# Patient Record
Sex: FEMALE | Race: WHITE | Employment: FULL TIME | URBAN - METROPOLITAN AREA
[De-identification: names, ages, dates, MRNs, and addresses within clinical notes are randomized per-mention and may not be internally consistent; named-entity substitution may affect disease eponyms.]

---

## 2024-03-26 ENCOUNTER — OFFICE VISIT (OUTPATIENT)
Dept: OBSTETRICS AND GYNECOLOGY | Facility: CLINIC | Age: 29
End: 2024-03-26
Payer: COMMERCIAL

## 2024-03-26 VITALS
SYSTOLIC BLOOD PRESSURE: 110 MMHG | WEIGHT: 184.97 LBS | DIASTOLIC BLOOD PRESSURE: 70 MMHG | BODY MASS INDEX: 34.04 KG/M2

## 2024-03-26 DIAGNOSIS — Z01.419 ENCOUNTER FOR WELL WOMAN EXAM WITH ROUTINE GYNECOLOGICAL EXAM: Primary | ICD-10-CM

## 2024-03-26 PROCEDURE — 1036F TOBACCO NON-USER: CPT | Performed by: NURSE PRACTITIONER

## 2024-03-26 PROCEDURE — 99395 PREV VISIT EST AGE 18-39: CPT | Performed by: NURSE PRACTITIONER

## 2024-03-26 RX ORDER — NORGESTIMATE AND ETHINYL ESTRADIOL 7DAYSX3 28
1 KIT ORAL DAILY
COMMUNITY
Start: 2024-03-03 | End: 2024-05-28

## 2024-03-26 NOTE — ASSESSMENT & PLAN NOTE
Hx of ASCUS, PAP and HPV NILM and negative in 2023.   Plans to stop her ocp. Will start a prenatal vitamin prior to trying for a pregnancy.

## 2024-03-26 NOTE — PROGRESS NOTES
"     HPI:   Ladan Trevino \"Ladan Trevino\" is a 28 y.o. who presents today for her annual gynecologic exam without complaints    She has the following concerns; None. She is doing well. Considering stopping her ocp as she wants to try for a pregnancy.     GYN HISTORY:  Periods are regular every 28-30 days, lasting 5 days.   Dysmenorrhea:none. Cyclic symptoms include none.   No intermenstrual bleeding, spotting, or discharge.    Current contraception: OCP (estrogen/progesterone)      Requests STD testing: no     PAP History   Last pap:   2023 Normal HPV Negative  History of abnormal pap: yes - Hx of ASCUS    Health Screening  Family history of breast, uterine, ovarian or colon cancer: no         The patient feels safe at home.         Review of Systems:   Constitutional: no fever and no chills.  Cardiovascular: no chest pain.   Respiratory: no shortness of breath.   Gastrointestinal: no nausea, no abdominal pain and no constipation  Genitourinary: no dysuria, no urinary incontinence, no vaginal dryness, no pelvic pain and no vaginal discharge.   Neurological: no headache.  Psychiatric: no anxiety and no depression.              Objective         /70   Wt 83.9 kg (184 lb 15.5 oz)   LMP 03/05/2024 (Exact Date)   BMI 34.04 kg/m²         Physical Exam:   Constitutional: Alert and in no acute distress. Well developed, well nourished.      Neck: No neck asymmetry. Supple. Thyroid not enlarged and there were no palpable thyroid nodules.      Cardiovascular: Heart rate and rhythm were normal, normal S1 and S2, no gallops, and no murmurs.      Pulmonary: No respiratory distress. Clear bilateral breath sounds.      Chest: Breasts: Normal appearance, no nipple discharge and no skin changes. Palpation of breasts and axillae: No palpable mass and no axillary lymphadenopathy.      Abdomen: Soft nontender; no abdominal mass palpated. Normal bowel sounds. No organomegaly.      Genitourinary:   - External genitalia: Normal.   - " Palpation of lymph nodes in groin: No inguinal lymphadenopathy.   - Bartholin's Urethral and Skenes Glands: Normal.   - Urethra: Normal.    -Bladder: Normal on palpation.   - Vagina: Normal.   - Cervix: Normal.   - Uterus: Normal. Right Adnexa/parametria: Normal. Left Adnexa/parametria: Normal.   - Perianal Area: Normal.      Skin: Normal skin color and pigmentation, normal skin turgor, and no rash     Psychiatric: Alert and oriented x 3. Affect normal to patient baseline. Mood: Appropriate.            Assessment/Plan         Problem List Items Addressed This Visit       Encounter for well woman exam with routine gynecological exam - Primary    Current Assessment & Plan     Hx of ASCUS, PAP and HPV NILM and negative in 2023.   Plans to stop her ocp. Will start a prenatal vitamin prior to trying for a pregnancy.          Follow-up annually; sooner if needed.       GUSTAVO Lawrence APRN-CNP 03/26/24 8:20 AM

## 2024-05-25 DIAGNOSIS — Z30.41 ENCOUNTER FOR SURVEILLANCE OF CONTRACEPTIVE PILLS: Primary | ICD-10-CM

## 2024-05-28 RX ORDER — NORGESTIMATE AND ETHINYL ESTRADIOL 7DAYSX3 28
1 KIT ORAL DAILY
Qty: 84 TABLET | Refills: 2 | Status: SHIPPED | OUTPATIENT
Start: 2024-05-28

## 2024-10-01 ENCOUNTER — APPOINTMENT (OUTPATIENT)
Dept: PRIMARY CARE | Facility: CLINIC | Age: 29
End: 2024-10-01
Payer: COMMERCIAL

## 2024-10-03 ENCOUNTER — LAB (OUTPATIENT)
Dept: LAB | Facility: LAB | Age: 29
End: 2024-10-03
Payer: COMMERCIAL

## 2024-10-03 DIAGNOSIS — R19.7 DIARRHEA, UNSPECIFIED: ICD-10-CM

## 2024-10-03 DIAGNOSIS — R10.84 GENERALIZED ABDOMINAL PAIN: Primary | ICD-10-CM

## 2024-10-03 DIAGNOSIS — R10.84 GENERALIZED ABDOMINAL PAIN: ICD-10-CM

## 2024-10-03 LAB
ALBUMIN SERPL BCP-MCNC: 4.5 G/DL (ref 3.4–5)
ALP SERPL-CCNC: 55 U/L (ref 33–110)
ALT SERPL W P-5'-P-CCNC: 17 U/L (ref 7–45)
ANION GAP SERPL CALC-SCNC: 10 MMOL/L (ref 10–20)
AST SERPL W P-5'-P-CCNC: 21 U/L (ref 9–39)
BASOPHILS # BLD AUTO: 0.05 X10*3/UL (ref 0–0.1)
BASOPHILS NFR BLD AUTO: 1 %
BILIRUB SERPL-MCNC: 0.6 MG/DL (ref 0–1.2)
BUN SERPL-MCNC: 18 MG/DL (ref 6–23)
CALCIUM SERPL-MCNC: 9.1 MG/DL (ref 8.6–10.3)
CHLORIDE SERPL-SCNC: 106 MMOL/L (ref 98–107)
CO2 SERPL-SCNC: 26 MMOL/L (ref 21–32)
CREAT SERPL-MCNC: 0.62 MG/DL (ref 0.5–1.05)
EGFRCR SERPLBLD CKD-EPI 2021: >90 ML/MIN/1.73M*2
EOSINOPHIL # BLD AUTO: 0.25 X10*3/UL (ref 0–0.7)
EOSINOPHIL NFR BLD AUTO: 5 %
ERYTHROCYTE [DISTWIDTH] IN BLOOD BY AUTOMATED COUNT: 14.3 % (ref 11.5–14.5)
GLUCOSE SERPL-MCNC: 79 MG/DL (ref 74–99)
HCT VFR BLD AUTO: 37.3 % (ref 36–46)
HGB BLD-MCNC: 11.9 G/DL (ref 12–16)
IMM GRANULOCYTES # BLD AUTO: 0.01 X10*3/UL (ref 0–0.7)
IMM GRANULOCYTES NFR BLD AUTO: 0.2 % (ref 0–0.9)
LYMPHOCYTES # BLD AUTO: 1.79 X10*3/UL (ref 1.2–4.8)
LYMPHOCYTES NFR BLD AUTO: 35.9 %
MCH RBC QN AUTO: 28.9 PG (ref 26–34)
MCHC RBC AUTO-ENTMCNC: 31.9 G/DL (ref 32–36)
MCV RBC AUTO: 91 FL (ref 80–100)
MONOCYTES # BLD AUTO: 0.31 X10*3/UL (ref 0.1–1)
MONOCYTES NFR BLD AUTO: 6.2 %
NEUTROPHILS # BLD AUTO: 2.57 X10*3/UL (ref 1.2–7.7)
NEUTROPHILS NFR BLD AUTO: 51.7 %
NRBC BLD-RTO: 0 /100 WBCS (ref 0–0)
PLATELET # BLD AUTO: 258 X10*3/UL (ref 150–450)
POTASSIUM SERPL-SCNC: 4.1 MMOL/L (ref 3.5–5.3)
PROT SERPL-MCNC: 7.3 G/DL (ref 6.4–8.2)
RBC # BLD AUTO: 4.12 X10*6/UL (ref 4–5.2)
SODIUM SERPL-SCNC: 138 MMOL/L (ref 136–145)
TSH SERPL-ACNC: 3.62 MIU/L (ref 0.44–3.98)
WBC # BLD AUTO: 5 X10*3/UL (ref 4.4–11.3)

## 2024-10-03 PROCEDURE — 84443 ASSAY THYROID STIM HORMONE: CPT

## 2024-10-03 PROCEDURE — 80053 COMPREHEN METABOLIC PANEL: CPT

## 2024-10-03 PROCEDURE — 36415 COLL VENOUS BLD VENIPUNCTURE: CPT

## 2024-10-03 PROCEDURE — 85025 COMPLETE CBC W/AUTO DIFF WBC: CPT

## 2024-10-07 ENCOUNTER — APPOINTMENT (OUTPATIENT)
Dept: PRIMARY CARE | Facility: CLINIC | Age: 29
End: 2024-10-07
Payer: COMMERCIAL

## 2024-10-07 VITALS
HEIGHT: 61 IN | OXYGEN SATURATION: 99 % | RESPIRATION RATE: 18 BRPM | TEMPERATURE: 97.3 F | WEIGHT: 183.4 LBS | SYSTOLIC BLOOD PRESSURE: 121 MMHG | HEART RATE: 75 BPM | DIASTOLIC BLOOD PRESSURE: 81 MMHG | BODY MASS INDEX: 34.63 KG/M2

## 2024-10-07 DIAGNOSIS — Z13.220 LIPID SCREENING: Primary | ICD-10-CM

## 2024-10-07 DIAGNOSIS — Z13.29 THYROID DISORDER SCREEN: ICD-10-CM

## 2024-10-07 DIAGNOSIS — R19.7 DIARRHEA, UNSPECIFIED TYPE: ICD-10-CM

## 2024-10-07 DIAGNOSIS — E66.09 CLASS 1 OBESITY DUE TO EXCESS CALORIES WITHOUT SERIOUS COMORBIDITY WITH BODY MASS INDEX (BMI) OF 34.0 TO 34.9 IN ADULT: ICD-10-CM

## 2024-10-07 DIAGNOSIS — R10.84 GENERALIZED ABDOMINAL PAIN: ICD-10-CM

## 2024-10-07 DIAGNOSIS — E66.811 CLASS 1 OBESITY DUE TO EXCESS CALORIES WITHOUT SERIOUS COMORBIDITY WITH BODY MASS INDEX (BMI) OF 34.0 TO 34.9 IN ADULT: ICD-10-CM

## 2024-10-07 PROCEDURE — 3008F BODY MASS INDEX DOCD: CPT | Performed by: NURSE PRACTITIONER

## 2024-10-07 PROCEDURE — 1036F TOBACCO NON-USER: CPT | Performed by: NURSE PRACTITIONER

## 2024-10-07 PROCEDURE — 99204 OFFICE O/P NEW MOD 45 MIN: CPT | Performed by: NURSE PRACTITIONER

## 2024-10-07 ASSESSMENT — ENCOUNTER SYMPTOMS
SPEECH DIFFICULTY: 0
DIARRHEA: 1
BLOOD IN STOOL: 0
DIZZINESS: 0
FREQUENCY: 0
CONSTITUTIONAL NEGATIVE: 1
VOMITING: 0
CONFUSION: 0
SLEEP DISTURBANCE: 0
SHORTNESS OF BREATH: 0
NERVOUS/ANXIOUS: 0
MYALGIAS: 0
FEVER: 0
DYSURIA: 0
ACTIVITY CHANGE: 0
WEAKNESS: 0
PALPITATIONS: 0
NAUSEA: 0
CONSTIPATION: 0
ARTHRALGIAS: 0
ABDOMINAL PAIN: 1
COUGH: 0
HEADACHES: 0
SORE THROAT: 0
CHILLS: 0
APNEA: 0
DIFFICULTY URINATING: 0

## 2024-10-07 ASSESSMENT — PATIENT HEALTH QUESTIONNAIRE - PHQ9
SUM OF ALL RESPONSES TO PHQ9 QUESTIONS 1 AND 2: 0
1. LITTLE INTEREST OR PLEASURE IN DOING THINGS: NOT AT ALL
2. FEELING DOWN, DEPRESSED OR HOPELESS: NOT AT ALL

## 2024-10-07 NOTE — PROGRESS NOTES
Subjective   Patient ID: Ladan Trevino is a 29 y.o. female who presents for New Patient Visit, Abdominal Pain, Diarrhea, and Obesity.    29-year-old female patient here to establish care.  Recently seen by gastroenterology due to abdominal pain and diarrhea.    Recent colonoscopy and EGD was still much GI unremarkable  Colestipol and bentyl prescribed but worsened her abdominal pain   Reports that she believes it was her oral contraceptive that have contributed to her discomfort.  Patient also reports a pelvic pain. Off ocp- 25 day cycle. Has GYN.  Has CT of the abdomen and pelvis with [facility] that she will schedule soon    Obesity: BMI 34.65.  Discussed diet and exercise      Abdominal Pain  Associated symptoms include diarrhea. Pertinent negatives include no arthralgias, constipation, dysuria, fever, frequency, headaches, myalgias, nausea or vomiting.   Diarrhea   Associated symptoms include abdominal pain. Pertinent negatives include no arthralgias, chills, coughing, fever, headaches, myalgias or vomiting.        Review of Systems   Constitutional: Negative.  Negative for activity change, chills and fever.   HENT:  Negative for congestion, postnasal drip, sneezing and sore throat.    Respiratory:  Negative for apnea, cough and shortness of breath.    Cardiovascular:  Negative for chest pain and palpitations.   Gastrointestinal:  Positive for abdominal pain and diarrhea. Negative for blood in stool, constipation, nausea and vomiting.   Genitourinary:  Positive for pelvic pain. Negative for difficulty urinating, dysuria, frequency, vaginal bleeding, vaginal discharge and vaginal pain.   Musculoskeletal:  Negative for arthralgias and myalgias.   Neurological:  Negative for dizziness, syncope, speech difficulty, weakness and headaches.   Psychiatric/Behavioral:  Negative for confusion and sleep disturbance. The patient is not nervous/anxious.        Objective   /81   Pulse 75   Temp 36.3 °C (97.3  "°F) (Temporal)   Resp 18   Ht 1.549 m (5' 1\")   Wt 83.2 kg (183 lb 6.4 oz)   SpO2 99%   BMI 34.65 kg/m²     Physical Exam  Vitals reviewed.   Constitutional:       Appearance: She is obese.   Cardiovascular:      Rate and Rhythm: Normal rate and regular rhythm.      Pulses: Normal pulses.      Heart sounds: Normal heart sounds.   Pulmonary:      Effort: Pulmonary effort is normal.      Breath sounds: Normal breath sounds.   Abdominal:      General: Bowel sounds are normal. There is no distension.      Palpations: There is no mass.      Tenderness: There is no abdominal tenderness.      Hernia: No hernia is present.   Neurological:      Mental Status: She is alert and oriented to person, place, and time.   Psychiatric:         Mood and Affect: Mood normal.         Behavior: Behavior normal.         Assessment/Plan   Problem List Items Addressed This Visit             ICD-10-CM    Lipid screening - Primary Z13.220    Relevant Orders    Lipid Panel    Thyroid disorder screen Z13.29    Relevant Orders    TSH with reflex to Free T4 if abnormal    Class 1 obesity due to excess calories without serious comorbidity with body mass index (BMI) of 34.0 to 34.9 in adult E66.811, E66.09, Z68.34     .A dietary \"pattern\" means generally being in the habit of eating certain types of foods while limiting others. Some people need to follow a particular dietary pattern because of their individual health needs; for example, if you have high blood pressure, your health care provider might recommend a diet low in sodium (salt).     If you are trying to improve your overall health and lower your risk of disease, establishing a healthy dietary pattern can help. This does not have to mean being extremely restrictive or eating only healthy choices. It is more of a lifestyle choice to incorporate certain dietary components while limiting others. You can find which approaches work for you, and that can help you maintain and build on a " "healthy eating pattern over time.     Some dietary patterns have been found to have specific health benefits. Others may have benefits for some people, for example, those with specific health conditions.     Mediterranean diet -- A Mediterranean diet is high in fruits, vegetables, beans, nuts, and seeds. It also includes olive oil as a source of healthy fat, and moderate amounts of fish, poultry, and dairy products, but little red meat. Studies have found that this type of diet lowers the risk of heart disease and stroke and helps control blood sugar in people with diabetes. It may also lower the risk of certain types of cancer.     DASH diet -- The Dietary Approaches to Stop Hypertension (DASH) diet requires a person to eat four to five servings of fruit, four to five servings of vegetables, and two to three servings of low-fat dairy per day. All foods must contain less than 25 percent total fat per serving. It can help lower blood pressure, especially when the person also reduces their salt intake. The DASH diet may also lower the risk of other health problems, including colorectal cancer, heart disease, and gout (in males).     Plant-based diets -- Plant-based diets focus on vegetables, fruits, grains, beans, and nuts and limit, or completely avoid, food from animals, such as meat and dairy. There are different types of plant-based diets, including:     ?Macrobiotic - This type of diet includes lots of vegetables, fruits, legumes, and seaweeds, as well as whole grains like brown rice. People who follow a macrobiotic diet might limit animal foods to white meat or fish once or twice a week.     ?Semi-vegetarian (sometimes called \"flexitarian\") - Some people choose to eat meat only occasionally, or avoid red meat but eat poultry and/or fish.     ?Lacto-ovovegetarian - This means a person eats milk and dairy products as well as eggs, but no meat.     ?Lactovegetarian - This means a person eats milk and dairy " products, but not eggs or meat.     ?Vegan - This means avoiding all food products that come from animal sources, including eggs and milk products.     Plant-based diets may lower the risk of obesity, heart disease, high blood pressure, diabetes, and some cancers. While plant-based diets are generally healthy, as with other patterns, it's also important to limit things like processed foods, unhealthy fats, sugar, and salt. If you do not eat meat and/or other animal products, it is important to ensure you are getting enough nutrients overall. Vegans, in particular, have been found to be low in nutrients like calcium and vitamin B12.      Low-fat diet -- A low-fat diet involves limiting intake of calories from fat. This pattern may have some benefits when it comes to maintaining a lower weight, but overall it is likely no more effective than other approaches in helping people lose weight. In general, few, if any, other health benefits have been linked to low-fat diets.     When following a low-fat diet, it is important to focus on whole grains, legumes, fruits, and vegetables, with limited refined grains and sugar. Fats should be from healthy sources, such as fatty fish, nuts, olive oil, and canola oil.      Low-cholesterol diet -- Cholesterol is typically found in foods with a lot of saturated fat, like red meat, butter, and cheese. A low-cholesterol diet focuses on limiting the amount of cholesterol in the diet. Since high-cholesterol foods also generally are high in unhealthy fats (such as saturated fat), limiting the cholesterol in your diet can also help reduce the amount of unhealthy fats you consume.          Relevant Orders    CBC and Auto Differential    Comprehensive Metabolic Panel    Diarrhea R19.7     Can take over-the-counter antidiarrheal for management  Follow-up with GI  Normal colonoscopy         Generalized abdominal pain R10.84     Intolerant benzo and colestipol  Follow-up with GI  Has CT of  abdomen and pelvis ordered with specialist-encouraged to schedule

## 2024-10-07 NOTE — ASSESSMENT & PLAN NOTE
Intolerant benzo and colestipol  Follow-up with GI  Has CT of abdomen and pelvis ordered with specialist-encouraged to schedule

## 2025-03-31 ENCOUNTER — APPOINTMENT (OUTPATIENT)
Dept: OBSTETRICS AND GYNECOLOGY | Facility: CLINIC | Age: 30
End: 2025-03-31
Payer: COMMERCIAL

## 2025-06-16 ENCOUNTER — APPOINTMENT (OUTPATIENT)
Dept: OBSTETRICS AND GYNECOLOGY | Facility: CLINIC | Age: 30
End: 2025-06-16
Payer: COMMERCIAL

## 2025-06-16 VITALS
WEIGHT: 174.38 LBS | SYSTOLIC BLOOD PRESSURE: 110 MMHG | DIASTOLIC BLOOD PRESSURE: 70 MMHG | BODY MASS INDEX: 32.95 KG/M2

## 2025-06-16 DIAGNOSIS — Z01.419 ENCOUNTER FOR WELL WOMAN EXAM WITH ROUTINE GYNECOLOGICAL EXAM: Primary | ICD-10-CM

## 2025-06-16 DIAGNOSIS — N94.6 PAINFUL MENSTRUAL PERIODS: ICD-10-CM

## 2025-06-16 PROCEDURE — 1036F TOBACCO NON-USER: CPT | Performed by: NURSE PRACTITIONER

## 2025-06-16 PROCEDURE — 99395 PREV VISIT EST AGE 18-39: CPT | Performed by: NURSE PRACTITIONER

## 2025-06-16 RX ORDER — IBUPROFEN 600 MG/1
600 TABLET, FILM COATED ORAL EVERY 6 HOURS PRN
Qty: 30 TABLET | Refills: 0 | Status: SHIPPED | OUTPATIENT
Start: 2025-06-16 | End: 2025-06-26

## 2025-06-16 NOTE — PROGRESS NOTES
"     HPI:   Ladan Trevino \"Ladan Trevino\" is a 29 y.o. who presents today for her annual gynecologic exam without complaints    She has the following concerns; None. She is doing well. Stopped her ocp in June 2024. She is having cramping and heavier bleeding since she stopped her ocp. She is having a lot of pelvic pain in between periods. She is having pain with intercourse. She is having pain deeper in the vagina.     No known family hx of endometriosis.   She has been trying to get pregnant since December 2024. She started taking her cycles and ovulation in March/ April. She is using ovulation predictor tests. Only had one positive predictor test.     She was seen by GI and had a normal workup. They did not find a reason for her abdominal pain. She has eliminated eggs and dairy from her diet and this has helped some.     GYN HISTORY:  Periods are regular every 28-30 days, lasting 5 days.   Dysmenorrhea:none. Cyclic symptoms include none.   No intermenstrual bleeding, spotting, or discharge.    Current contraception: OCP (estrogen/progesterone)      Requests STD testing: no     PAP History   Last pap:   2023 Normal HPV Negative  History of abnormal pap: yes - Hx of ASCUS    Health Screening  Family history of breast, uterine, ovarian or colon cancer: no         The patient feels safe at home.   History reviewed. No pertinent past medical history.   Past Surgical History:   Procedure Laterality Date    OTHER SURGICAL HISTORY  01/18/2022    No history of surgery          Review of Systems:   Constitutional: no fever and no chills.  Cardiovascular: no chest pain.   Respiratory: no shortness of breath.   Gastrointestinal: no nausea, no abdominal pain and no constipation  Genitourinary: no dysuria, no urinary incontinence, no vaginal dryness, no pelvic pain and no vaginal discharge.   Neurological: no headache.  Psychiatric: no anxiety and no depression.              Objective         /70   Wt 79.1 kg (174 lb 6.1 " oz)   LMP 06/05/2025   BMI 32.95 kg/m²         Physical Exam:   Constitutional: Alert and in no acute distress. Well developed, well nourished.      Neck: No neck asymmetry. Supple. Thyroid not enlarged and there were no palpable thyroid nodules.      Cardiovascular: Heart rate and rhythm were normal, normal S1 and S2, no gallops, and no murmurs.      Pulmonary: No respiratory distress. Clear bilateral breath sounds.      Chest: Breasts: Normal appearance, no nipple discharge and no skin changes. Palpation of breasts and axillae: No palpable mass and no axillary lymphadenopathy.      Abdomen: Soft nontender; no abdominal mass palpated. Normal bowel sounds. No organomegaly.      Genitourinary:   - External genitalia: Normal.   - Palpation of lymph nodes in groin: No inguinal lymphadenopathy.   - Bartholin's Urethral and Skenes Glands: Normal.   - Urethra: Normal.    -Bladder: Normal on palpation.   - Vagina: Normal.   - Cervix: Normal.   - Uterus: Normal. Right Adnexa/parametria: Normal. Left Adnexa/parametria: Normal.   - Perianal Area: Normal.      Skin: Normal skin color and pigmentation, normal skin turgor, and no rash     Psychiatric: Alert and oriented x 3. Affect normal to patient baseline. Mood: Appropriate.              Assessment/Plan   Diagnoses and all orders for this visit:  Encounter for well woman exam with routine gynecological exam  Here for well woman exam and with concern for painful periods after stopping her ocp. She will try  mg as needed for pain with periods. She is trying to conceive. She will continue to track her cycles and will time intercourse. She will restart her prenatal vitamin.   Painful menstrual periods  -     ibuprofen 600 mg tablet; Take 1 tablet (600 mg) by mouth every 6 hours if needed for mild pain (1 - 3) for up to 10 days.  She will follow-up in 3 months to reevaluate her cycles. She will follow-up sooner with a positive pregnancy test.        Quyen Angulo,  ALVAREZ-CNP 06/16/25 12:23 PM

## 2025-09-22 ENCOUNTER — APPOINTMENT (OUTPATIENT)
Dept: OBSTETRICS AND GYNECOLOGY | Facility: CLINIC | Age: 30
End: 2025-09-22
Payer: COMMERCIAL

## 2025-10-13 ENCOUNTER — APPOINTMENT (OUTPATIENT)
Dept: PRIMARY CARE | Facility: CLINIC | Age: 30
End: 2025-10-13
Payer: COMMERCIAL